# Patient Record
Sex: MALE | Race: WHITE | NOT HISPANIC OR LATINO | URBAN - METROPOLITAN AREA
[De-identification: names, ages, dates, MRNs, and addresses within clinical notes are randomized per-mention and may not be internally consistent; named-entity substitution may affect disease eponyms.]

---

## 2017-04-18 ENCOUNTER — OFFICE VISIT - HEALTHEAST (OUTPATIENT)
Dept: INTERNAL MEDICINE | Facility: CLINIC | Age: 62
End: 2017-04-18

## 2017-04-18 ENCOUNTER — COMMUNICATION - HEALTHEAST (OUTPATIENT)
Dept: INTERNAL MEDICINE | Facility: CLINIC | Age: 62
End: 2017-04-18

## 2017-04-18 DIAGNOSIS — M79.605 PAIN IN BOTH LOWER EXTREMITIES: ICD-10-CM

## 2017-04-18 DIAGNOSIS — Z79.899 MEDICATION MANAGEMENT: ICD-10-CM

## 2017-04-18 DIAGNOSIS — Z12.5 PROSTATE CANCER SCREENING: ICD-10-CM

## 2017-04-18 DIAGNOSIS — E78.2 MIXED HYPERLIPIDEMIA: ICD-10-CM

## 2017-04-18 DIAGNOSIS — Z00.00 ROUTINE GENERAL MEDICAL EXAMINATION AT A HEALTH CARE FACILITY: ICD-10-CM

## 2017-04-18 DIAGNOSIS — Z23 NEED FOR VACCINATION: ICD-10-CM

## 2017-04-18 DIAGNOSIS — M79.604 PAIN IN BOTH LOWER EXTREMITIES: ICD-10-CM

## 2017-04-18 LAB
CHOLEST SERPL-MCNC: 250 MG/DL
FASTING STATUS PATIENT QL REPORTED: YES
HDLC SERPL-MCNC: 47 MG/DL
LDLC SERPL CALC-MCNC: 154 MG/DL
PSA SERPL-MCNC: 0.9 NG/ML (ref 0–4.5)
TRIGL SERPL-MCNC: 245 MG/DL

## 2017-04-18 ASSESSMENT — MIFFLIN-ST. JEOR: SCORE: 1606.49

## 2017-04-27 ENCOUNTER — COMMUNICATION - HEALTHEAST (OUTPATIENT)
Dept: INTERNAL MEDICINE | Facility: CLINIC | Age: 62
End: 2017-04-27

## 2017-04-27 DIAGNOSIS — E78.5 HYPERLIPIDEMIA: ICD-10-CM

## 2017-04-27 RX ORDER — ATORVASTATIN CALCIUM 10 MG/1
10 TABLET, FILM COATED ORAL AT BEDTIME
Qty: 90 TABLET | Refills: 3 | Status: SHIPPED | OUTPATIENT
Start: 2017-04-27

## 2017-06-06 ENCOUNTER — COMMUNICATION - HEALTHEAST (OUTPATIENT)
Dept: INTERNAL MEDICINE | Facility: CLINIC | Age: 62
End: 2017-06-06

## 2017-06-06 ENCOUNTER — AMBULATORY - HEALTHEAST (OUTPATIENT)
Dept: INTERNAL MEDICINE | Facility: CLINIC | Age: 62
End: 2017-06-06

## 2017-06-06 DIAGNOSIS — M54.16 LUMBAR RADICULOPATHY: ICD-10-CM

## 2017-06-10 ENCOUNTER — RECORDS - HEALTHEAST (OUTPATIENT)
Dept: ADMINISTRATIVE | Facility: OTHER | Age: 62
End: 2017-06-10

## 2017-06-10 ENCOUNTER — AMBULATORY - HEALTHEAST (OUTPATIENT)
Dept: INTERNAL MEDICINE | Facility: CLINIC | Age: 62
End: 2017-06-10

## 2017-06-16 ENCOUNTER — COMMUNICATION - HEALTHEAST (OUTPATIENT)
Dept: INTERNAL MEDICINE | Facility: CLINIC | Age: 62
End: 2017-06-16

## 2017-06-19 ENCOUNTER — AMBULATORY - HEALTHEAST (OUTPATIENT)
Dept: INTERNAL MEDICINE | Facility: CLINIC | Age: 62
End: 2017-06-19

## 2017-06-19 DIAGNOSIS — M48.061 SPINAL STENOSIS AT L4-L5 LEVEL: ICD-10-CM

## 2017-06-20 ENCOUNTER — AMBULATORY - HEALTHEAST (OUTPATIENT)
Dept: NEUROSURGERY | Facility: CLINIC | Age: 62
End: 2017-06-20

## 2017-06-26 ENCOUNTER — OFFICE VISIT - HEALTHEAST (OUTPATIENT)
Dept: NEUROSURGERY | Facility: CLINIC | Age: 62
End: 2017-06-26

## 2017-06-26 ENCOUNTER — AMBULATORY - HEALTHEAST (OUTPATIENT)
Dept: NEUROSURGERY | Facility: CLINIC | Age: 62
End: 2017-06-26

## 2017-06-26 DIAGNOSIS — M51.26 LUMBAR HERNIATED DISC: ICD-10-CM

## 2017-06-26 ASSESSMENT — MIFFLIN-ST. JEOR: SCORE: 1574.74

## 2017-06-28 ENCOUNTER — RECORDS - HEALTHEAST (OUTPATIENT)
Dept: RADIOLOGY | Facility: CLINIC | Age: 62
End: 2017-06-28

## 2018-01-29 ENCOUNTER — RECORDS - HEALTHEAST (OUTPATIENT)
Dept: ADMINISTRATIVE | Facility: OTHER | Age: 63
End: 2018-01-29

## 2021-05-30 VITALS — BODY MASS INDEX: 27.11 KG/M2 | HEIGHT: 69 IN | WEIGHT: 183 LBS

## 2021-05-31 VITALS — BODY MASS INDEX: 26.07 KG/M2 | HEIGHT: 69 IN | WEIGHT: 176 LBS

## 2021-06-10 NOTE — PROGRESS NOTES
Office Visit - Physical   Richard Desai   62 y.o.  male in for annual examination.  He has been having trouble the past several months with pain in the mid buttock region radiating all the way down to his ankles posteriorly.  Never has any pain in the front.  No weakness.  No loss of bowel or bladder function.  No rashes.  No injuries.      Date of visit: 4/18/2017  Physician: Robert Murray MD     Assessment and Plan   1. Routine general medical examination at a health care facility    - Urinalysis-UC if Indicated    2. Mixed hyperlipidemia  Currently on Lipitor.  No problems.  No cardiovascular symptoms  - Lipid Cascade    3. Medication management  No obvious side effects from meds.  Muscle pain is a concern here.  - HM1(CBC and Differential)  - Comprehensive Metabolic Panel  - HM1 (CBC with Diff)    4. Pain in both lower extremities  Check a CPK etc.  Consider MRI of the lumbosacral spine to find out the cause of his buttock and lower leg pain.  He will also need a colonoscopy in the next few months because of 10 year wait and is getting some relief of his back discomfort when he empties his bowel.  - C-Reactive Protein  - Erythrocyte Sedimentation Rate  - Thyroid Stimulating Hormone (TSH)  - CK Total    5. Need for vaccination  Zostavax today.  - Varicella-zoster vaccine subq    6. Prostate cancer screening  Dad had prostate cancer.  He has no nocturia.  - PSA (Prostatic-Specific Antigen), Annual Screen      The following high BMI interventions were performed this visit: encouragement to exercise    No Follow-up on file.     Chief Complaint   Chief Complaint   Patient presents with     Annual Exam     fasting        Patient Profile   Social History     Social History Narrative     No narrative on file        Past Medical History   Patient Active Problem List   Diagnosis     Mixed hyperlipidemia       Past Surgical History  He has a past surgical history that includes Fracture surgery.     History of  "Present Illness   This 62 y.o. old male is in for physical examination.  His only complaint is pain in the back buttocks and down the posterior legs.  Symptoms present for several weeks or months.  No falls.  No leg weakness.  No numbness.  He gets some mild relief when he passes his stool.  No blood in his stool.  No diarrhea.  No cramping.  Symptoms are worse when he sitting always in bed.  He will wake up at night when he turns because of pain in the low back buttock region.  No chest pain with exertion.  No claudication symptoms  No dysuria hematuria flank  No dysphasia.  No change in bowel habits.  He is due for colonoscopy this year.  No musculoskeletal complaints such as swollen knees hands wrist etc.  No trouble bending forward.  No TIAs.  No epilepsy.  No migraine.  No numbness of the hands or feet.    Review of Systems: A comprehensive review of systems was negative except as noted.     Medications and Allergies   Current Outpatient Prescriptions   Medication Sig Dispense Refill     aspirin 81 MG EC tablet Take 81 mg by mouth daily.       atorvastatin (LIPITOR) 10 MG tablet Take 10 mg by mouth at bedtime.       No current facility-administered medications for this visit.      No Known Allergies     Family and Social History   No family history on file.     Social History   Substance Use Topics     Smoking status: Never Smoker     Smokeless tobacco: Never Used     Alcohol use Not on file        Physical Exam   General Appearance:   The appearing male.  Blood pressure 128/80.    Pulse (!) 58  Ht 5' 8.75\" (1.746 m)  Wt 183 lb (83 kg)  SpO2 96%  BMI 27.22 kg/m2    EYES: Eyelids, conjunctiva, and sclera were normal. Pupils were normal. Cornea, iris, and lens were normal bilaterally.  He does see an eye doctor on a regular basis.  HEAD, EARS, NOSE, MOUTH, AND THROAT: Head and face were normal. Hearing was normal to voice and the ears were normal to external exam. Nose appearance was normal and there was no " discharge. Oropharynx was normal.  NECK: Neck appearance was normal. There were no neck masses and the thyroid was not enlarged.  No adenopathy.  RESPIRATORY: Breathing pattern was normal and the chest moved symmetrically.  Percussion/auscultatory percussion was normal.  Lung sounds were normal and there were no abnormal sounds.  CARDIOVASCULAR: Heart rate and rhythm were normal.  S1 and S2 were normal and there were no extra sounds or murmurs. Peripheral pulses in arms and legs were normal.  Jugular venous pressure was normal.  There was no peripheral edema.  GASTROINTESTINAL: The abdomen was normal in contour.  Bowel sounds were present.  Percussion detected no organ enlargement or tenderness.  Palpation detected no tenderness, mass, or enlarged organs.   MUSCULOSKELETAL: Skeletal configuration was normal and muscle mass was normal for age. Joint appearance was overall normal.  LYMPHATIC: There were no enlarged nodes.  SKIN/HAIR/NAILS: Skin color was normal.  There were no skin lesions.  Hair and nails were normal.  NEUROLOGIC: The patient was alert and oriented to person, place, time, and circumstance. Speech was normal. Cranial nerves were normal. Motor strength was normal for age. The patient was normally coordinated.  Monofilament fiber sensation normal in the hands and feet.  He can stand on heels and toes okay.  He can bend forward at the waist 90  without any back pain.  He can jump without any pain felt in his low back.  PSYCHIATRIC:  Mood and affect were normal and the patient had normal recent and remote memory. The patient's judgment and insight were normal.    ADDITIONAL VITAL SIGNS: Pulse 60 and regular.  CHEST WALL/BREASTS: Normal male.    RECTAL: No masses.  Prostate normal for age.  No nodules.    GENITAL/URINARY: Normal male.       Additional Information        Robert Murray MD  Internal Medicine  Contact me at [phone]

## 2021-06-11 NOTE — PROGRESS NOTES
Neurosurgery consultation was requested by: Dr. Robert Murray  Pain: Denies back pain   Radicular Pain is present: Bilateral buttocks pain down posterior side of both legs to the ankle.   Lhermitte sign: No  Motor complaints: Denies weakness  Sensory complaints: Denies numbness and tingling   Gait and balance issues: Denies   Bowel or bladder issues: Denies   Duration of SX is: 6 months   The symptoms are worse with: Everything   The symptoms are better with: tylenol   Injury: Denies   Severity is: Mild - moderate  Patient has tried the following conservative measures: Denies   TREVIN score is: 13%  AUNG Escalera

## 2021-06-11 NOTE — PROGRESS NOTES
This is a level 2 office consult.  Consult was requested by Dr. Murray.  Richard Desai  is a 62 y.o. male     Chief complaint: Pain bilateral buttocks and legs.    HPI: The onset was December 2016.  Equal bilaterally.  Worse with standing or working.  No weakness, no numbness, no tingling, no trouble with bladder control.  Has not tried conservative treatment yet.    PMH/ROS: Good health.  No heart disease, lung disease, cancer, diabetes.    Exam: Well-developed and well-nourished.  Thin.  BMI 26.  Gait okay.  Range of motion okay.  Knee reflexes present, ankle reflexes absent.  Good strength.  Good sensation.  Straight leg raising negative.  Conor's sign negative.  No atrophy.    Studies/imaging: MRI shows a bulging disc at L4-5 with a central disc protrusion with significant lumbar spinal stenosis at that level    Impression: Lumbar spinal stenosis secondary to thick ligament and bulging and herniated disc L4-5    Plan: I showed the pictures to the patient and his wife.  Patient would like to try conservative treatment.  I think that ultimately he is going to need surgery to solve the problem.  I am ordering trial of conservative treatment at the spine center including physical therapy, traction, epidural steroid injections, etc.  The patient and his wife are satisfied with the plan.

## 2021-06-15 PROBLEM — E78.2 MIXED HYPERLIPIDEMIA: Status: ACTIVE | Noted: 2017-04-18
